# Patient Record
Sex: MALE | Race: OTHER | NOT HISPANIC OR LATINO | URBAN - METROPOLITAN AREA
[De-identification: names, ages, dates, MRNs, and addresses within clinical notes are randomized per-mention and may not be internally consistent; named-entity substitution may affect disease eponyms.]

---

## 2021-12-13 ENCOUNTER — EMERGENCY (EMERGENCY)
Facility: HOSPITAL | Age: 21
LOS: 1 days | Discharge: HOME | End: 2021-12-13
Attending: STUDENT IN AN ORGANIZED HEALTH CARE EDUCATION/TRAINING PROGRAM | Admitting: STUDENT IN AN ORGANIZED HEALTH CARE EDUCATION/TRAINING PROGRAM
Payer: COMMERCIAL

## 2021-12-13 VITALS
RESPIRATION RATE: 15 BRPM | HEART RATE: 96 BPM | TEMPERATURE: 99 F | SYSTOLIC BLOOD PRESSURE: 141 MMHG | DIASTOLIC BLOOD PRESSURE: 82 MMHG | OXYGEN SATURATION: 100 %

## 2021-12-13 PROCEDURE — 99284 EMERGENCY DEPT VISIT MOD MDM: CPT

## 2021-12-13 NOTE — ED ADULT TRIAGE NOTE - CHIEF COMPLAINT QUOTE
Pt. c/o nausea, diarrhea, fevers, and decreased PO intake x1 week. Patient endorses recent travel to [place]. Denies any chest pain, SOB.

## 2021-12-14 LAB

## 2021-12-14 RX ORDER — ONDANSETRON 8 MG/1
4 TABLET, FILM COATED ORAL ONCE
Refills: 0 | Status: COMPLETED | OUTPATIENT
Start: 2021-12-14 | End: 2021-12-14

## 2021-12-14 RX ORDER — IBUPROFEN 200 MG
600 TABLET ORAL ONCE
Refills: 0 | Status: COMPLETED | OUTPATIENT
Start: 2021-12-14 | End: 2021-12-14

## 2021-12-14 RX ADMIN — ONDANSETRON 4 MILLIGRAM(S): 8 TABLET, FILM COATED ORAL at 00:58

## 2021-12-14 RX ADMIN — Medication 600 MILLIGRAM(S): at 00:58

## 2021-12-14 NOTE — ED PROVIDER NOTE - PHYSICAL EXAMINATION
GEN - NAD; well appearing; A&O x3   HEAD - NC/AT   EYES- PERRL, EOMI  ENT: Airway patent, mmm, Oral cavity and pharynx normal. No inflammation, swelling, exudate, or lesions.  NECK: Neck supple, non-tender without lymphadenopathy, no masses.  PULMONARY - CTA b/l, symmetric breath sounds. No W/R/R.  CARDIAC -s1s2, RRR, no M,G,R, No JVD  ABDOMEN - +BS, ND, NT, soft, no guarding, no rebound, no masses , no rigidity  BACK - Normal  spine   EXTREMITIES - FROM, symmetric pulses, capillary refill < 2 seconds, no edema, 5/5 strength in b/l UE and LE  SKIN - no rash or bruising   NEUROLOGIC - alert, speech clear, no focal deficits, CN II-XII grossly intact, normal gait, sensation grossly intact  PSYCH -nl mood/affect, nl insight.

## 2021-12-14 NOTE — ED PROVIDER NOTE - NSFOLLOWUPINSTRUCTIONS_ED_ALL_ED_FT
You are discharged to go home  There is concern your symptoms may be due to an infection with COVID-19 (the coronavirus).  A 14 day quarantine at home was recommended to you for this reason.    Please return to the Emergency Room if your symptoms change or worsen    You will receive a text message with your results. Until that time please follow the instructions below and continue to do so if your results are positive.  ______________________________________________________________________________________________________________  A 14 DAY SELF-QUARANTINE PERIOD WAS RECOMMENDED TO YOU AS PART OF YOUR CARE:  FOR A 14 DAY PERIOD:    Stay inside your home as much as possible, avoiding public places or public interaction.     Do not go to work. If you do enter any public domain, at minimum wear a surgical mask at all times.     Even while indoors, attempt to remain isolated from other individuals such as family or friends, as much as possible.     Return to the emergency room for any symptoms such as worsening shortness of breath, confusion, significant worsening cough interfering with breathing, high fevers that will not go down with tylenol or ibuprofen, or severe weakness/malaise.    Take tylenol 1000mg every 6 hours for body pain or fevers, fluids, and rest

## 2021-12-14 NOTE — ED PROVIDER NOTE - CLINICAL SUMMARY MEDICAL DECISION MAKING FREE TEXT BOX
20 yo M denies pmh and not vaccinated for COVID presents after returning from [place] 1 week ago and complaining of 1 week of chills, body aches, nausea, and intermittent diarrhea. Pt also endorses dry cough. Possible covid vs viral illness. Symptomatic control prn. Pt requesting food/drink at this time and afebrile with SpO2 100% on RA. Pt requesting to be discharged with e-results. Pt given strict isolation instructions while results pending and urged to discuss with his doctor. Strict return precautions for worsening symptoms including but not limited to worsening cough, pain, shortness of breath.

## 2021-12-14 NOTE — ED PROVIDER NOTE - PATIENT PORTAL LINK FT
You can access the FollowMyHealth Patient Portal offered by [facility] by registering at the following website: [email or web address]. By joining [facility]’s FollowMyHealth portal, you will also be able to view your health information using other applications (apps) compatible with our system.

## 2021-12-14 NOTE — ED PROVIDER NOTE - OBJECTIVE STATEMENT
22 yo M denies pmh and not vaccinated for COVID presents after returning from [place] 1 week ago and complaining of 1 week of chills, body aches, nausea, and intermittent diarrhea. Pt also endorses dry cough. Has not been tested for covid and has not taken medications for his symptoms. No known sick contacts. No other current complaints at this time.

## 2021-12-14 NOTE — ED ADULT NURSE NOTE - OBJECTIVE STATEMENT
pt present s with 5 day history of sore throat cough loss of appetite and diarrhea, states that he is drinking fluids

## 2021-12-15 NOTE — ED POST DISCHARGE NOTE - REASON FOR FOLLOW-UP
COVID-19 : detected. Patient contact # [phone] message left with Call Back  P.A. number and hours for return call back. alt # [phone] S/W patient's mother patient aware of COVID-19 called yesterday and was given all COVID-19 precautions. Strict return precautions given to patient's mother. Other

## 2022-09-13 NOTE — ED ADULT NURSE NOTE - NS ED NURSE RECORD ANOTHER HT AND WT
"Denies chest pain/pressure, shortness of breath, lightheadedness, nausea, increased fatigue, syncope or pre-syncope.    Lung sounds clear bilaterally.    Chief Complaint   Patient presents with     Telemedicine consult     3 mo. F/U ablation       Initial Pulse 60   Temp 97.4  F (36.3  C) (Temporal)   Resp 16   Wt 80.2 kg (176 lb 12.8 oz)   SpO2 99%   BMI 25.37 kg/m   Estimated body mass index is 25.37 kg/m  as calculated from the following:    Height as of 6/27/22: 1.778 m (5' 10\").    Weight as of this encounter: 80.2 kg (176 lb 12.8 oz).  Medication Reconciliation: complete    Norma Garcia RN  " No